# Patient Record
Sex: FEMALE | Race: BLACK OR AFRICAN AMERICAN | Employment: OTHER | URBAN - METROPOLITAN AREA
[De-identification: names, ages, dates, MRNs, and addresses within clinical notes are randomized per-mention and may not be internally consistent; named-entity substitution may affect disease eponyms.]

---

## 2017-04-26 DIAGNOSIS — E11.9 TYPE 2 DIABETES MELLITUS WITHOUT COMPLICATION (H): ICD-10-CM

## 2017-04-27 RX ORDER — FLURBIPROFEN SODIUM 0.3 MG/ML
SOLUTION/ DROPS OPHTHALMIC
Qty: 100 EACH | Refills: 5 | Status: SHIPPED | OUTPATIENT
Start: 2017-04-27 | End: 2018-10-17

## 2017-04-28 DIAGNOSIS — Z79.4 TYPE 2 DIABETES MELLITUS WITH HYPERGLYCEMIA, WITH LONG-TERM CURRENT USE OF INSULIN (H): ICD-10-CM

## 2017-04-28 DIAGNOSIS — E11.65 TYPE 2 DIABETES MELLITUS WITH HYPERGLYCEMIA, WITH LONG-TERM CURRENT USE OF INSULIN (H): ICD-10-CM

## 2017-05-01 RX ORDER — CANAGLIFLOZIN 100 MG/1
TABLET, FILM COATED ORAL
Qty: 90 TABLET | Refills: 0 | OUTPATIENT
Start: 2017-05-01

## 2017-05-01 NOTE — TELEPHONE ENCOUNTER
Recieved refill request for NVOKANA TABS 100MG . Patient needs appointment scheduled prior to any refills. Clinic RN Care Coordinator notified and will follow up with the patient as appropriate. The pharmacy has been notified that the medication will not be refilled prior to an appointment being scheduled.

## 2017-05-03 DIAGNOSIS — Z79.4 TYPE 2 DIABETES MELLITUS WITH HYPERGLYCEMIA, WITH LONG-TERM CURRENT USE OF INSULIN (H): ICD-10-CM

## 2017-05-03 DIAGNOSIS — E11.65 TYPE 2 DIABETES MELLITUS WITH HYPERGLYCEMIA, WITH LONG-TERM CURRENT USE OF INSULIN (H): ICD-10-CM

## 2017-05-17 DIAGNOSIS — Z79.4 TYPE 2 DIABETES MELLITUS WITH HYPERGLYCEMIA, WITH LONG-TERM CURRENT USE OF INSULIN (H): ICD-10-CM

## 2017-05-17 DIAGNOSIS — E11.65 TYPE 2 DIABETES MELLITUS WITH HYPERGLYCEMIA, WITH LONG-TERM CURRENT USE OF INSULIN (H): ICD-10-CM

## 2017-05-20 RX ORDER — CANAGLIFLOZIN 100 MG/1
TABLET, FILM COATED ORAL
Qty: 30 TABLET | OUTPATIENT
Start: 2017-05-20

## 2017-05-20 NOTE — TELEPHONE ENCOUNTER
Recieved refill request for INVOKANA TABS 100MG . Patient needs appointment scheduled prior to any refills. Clinic RN Care Coordinator notified and will follow up with the patient as appropriate. The pharmacy has been notified that the medication will not be refilled prior to an appointment being scheduled.

## 2017-05-22 DIAGNOSIS — E11.65 TYPE 2 DIABETES MELLITUS WITH HYPERGLYCEMIA, WITH LONG-TERM CURRENT USE OF INSULIN (H): ICD-10-CM

## 2017-05-22 DIAGNOSIS — Z79.4 TYPE 2 DIABETES MELLITUS WITH HYPERGLYCEMIA, WITH LONG-TERM CURRENT USE OF INSULIN (H): ICD-10-CM

## 2017-06-15 DIAGNOSIS — E11.65 TYPE 2 DIABETES MELLITUS WITH HYPERGLYCEMIA, WITHOUT LONG-TERM CURRENT USE OF INSULIN (H): ICD-10-CM

## 2017-06-19 NOTE — TELEPHONE ENCOUNTER
metFORMIN       Last Written Prescription Date: 10/31/16  Last Fill Quantity: 180,   # refills: 0  Last Office Visit : 11/7/16  Future Office visit:  7/31/17

## 2017-07-31 ENCOUNTER — OFFICE VISIT (OUTPATIENT)
Dept: ENDOCRINOLOGY | Facility: CLINIC | Age: 72
End: 2017-07-31

## 2017-07-31 VITALS
SYSTOLIC BLOOD PRESSURE: 156 MMHG | HEART RATE: 73 BPM | BODY MASS INDEX: 31.12 KG/M2 | HEIGHT: 62 IN | DIASTOLIC BLOOD PRESSURE: 91 MMHG | WEIGHT: 169.1 LBS | OXYGEN SATURATION: 97 %

## 2017-07-31 DIAGNOSIS — E66.01 MORBID OBESITY WITH BODY MASS INDEX OF 60.0-69.9 IN ADULT (H): ICD-10-CM

## 2017-07-31 DIAGNOSIS — E11.65 TYPE 2 DIABETES MELLITUS WITH HYPERGLYCEMIA, WITHOUT LONG-TERM CURRENT USE OF INSULIN (H): ICD-10-CM

## 2017-07-31 DIAGNOSIS — E11.65 TYPE 2 DIABETES MELLITUS WITH HYPERGLYCEMIA, WITH LONG-TERM CURRENT USE OF INSULIN (H): ICD-10-CM

## 2017-07-31 DIAGNOSIS — Z79.4 TYPE 2 DIABETES MELLITUS WITH HYPERGLYCEMIA, WITH LONG-TERM CURRENT USE OF INSULIN (H): ICD-10-CM

## 2017-07-31 RX ORDER — TOPIRAMATE 25 MG/1
50 TABLET, FILM COATED ORAL DAILY
Qty: 388 TABLET | Refills: 5 | Status: SHIPPED | OUTPATIENT
Start: 2017-07-31

## 2017-07-31 ASSESSMENT — ENCOUNTER SYMPTOMS
LIGHT-HEADEDNESS: 0
NUMBNESS: 0
EYE WATERING: 0
PARALYSIS: 0
HYPERTENSION: 1
LEG PAIN: 1
EYE REDNESS: 0
EYE PAIN: 1
EXERCISE INTOLERANCE: 0
TREMORS: 0
SEIZURES: 0
DOUBLE VISION: 0
LEG SWELLING: 0
SPEECH CHANGE: 1
EYE IRRITATION: 1
TINGLING: 0
PALPITATIONS: 0
TACHYCARDIA: 0
DISTURBANCES IN COORDINATION: 1
MEMORY LOSS: 1
WEAKNESS: 0
HEADACHES: 1
CLAUDICATION: 0
ORTHOPNEA: 0
HYPOTENSION: 0
SYNCOPE: 0
SLEEP DISTURBANCES DUE TO BREATHING: 0
DIZZINESS: 1
LOSS OF CONSCIOUSNESS: 0

## 2017-07-31 NOTE — NURSING NOTE
"Chief Complaint   Patient presents with     Weight Problem     RMWM       Vitals:    07/31/17 1057   BP: (!) 156/91   Pulse: 73   SpO2: 97%   Weight: 169 lb 1.6 oz   Height: 5' 2\"       Body mass index is 30.93 kg/(m^2).    Alex Warren CMA                       "

## 2017-07-31 NOTE — PROGRESS NOTES
"    Return Medical Weight Management Note     Tita Way  MRN:  [id number]  :  1945  ROHAN:  2017    Dear Dr. Noel,     I had the pleasure of seeing your patient Tita Way.  She is a 70 year old female who I am continuing to see for treatment of obesity related to: DM-2, Hyperlipidemia, Sleep Apnea (has CPAP and does not use), Diminished functional capacity (wheelchair bound) and Depression.    CURRENT WEIGHT:   169 lbs 1.6 oz    Wt Readings from Last 4 Encounters:   17 76.7 kg (169 lb 1.6 oz)   16 79.2 kg (174 lb 11.2 oz)   16 78.7 kg (173 lb 8 oz)   16 77.6 kg (171 lb)     Height:  5' 2\"  Body Mass Index:  Body mass index is 30.93 kg/(m^2).  Vitals:  B/P: 162/101, P: 68    Initial consult weight was 356 on 13.  Weight change since last seen on 2016 is down 5 pounds.   Total loss is 187 pounds.    INTERVAL HISTORY:  She is doing well and generally happy with her weight loss, no problems from the medications. Continues on Byetta 10 bid, topiramate 50 bid, metformin 500 bid and invokana 100/d. Her HbA1c is 8.8 at her primary and she needs better DM control.    Diet and Activity Changes Since Last Visit Reviewed With Patient 2017   I have made the following changes to my diet since my last visit: none   With regards to my diet, I am still struggling with: none   For breakfast, I typically eat: eggs turkeysausage    For lunch, I typically eat: turkey breast sandwich   For supper, I typically eat: salad fish chicken   For snack(s), I typically eat: cheese puffs   I have made the following changes to my activity/exercise since my last visit: none   With regards to my activity/exercise, I am still struggling with: walking standing       ROS    MEDICATIONS:   Current Outpatient Prescriptions   Medication     metFORMIN (GLUCOPHAGE) 500 MG tablet     canagliflozin (INVOKANA) 100 MG tablet     B-D U/F insulin pen needle     topiramate (TOPAMAX) 25 MG tablet     " BYETTA 10 MCG PEN 10 MCG/0.04ML pen (contains 2.4 ml = 60 doses)     Alcohol Swabs (ALCOHOL PADS) 70 % PADS     DIPHENHYDRAMINE HCL PO     topiramate (TOPAMAX) 25 MG tablet     guaiFENesin-dextromethorphan (ROBITUSSIN DM) 100-10 MG/5ML syrup     acetaminophen (TYLENOL) 650 MG CR tablet     nystatin (MYCOSTATIN) cream     acetaminophen (TYLENOL) 325 MG tablet     COMPRESSION STOCKINGS     fluticasone (FLONASE) 50 MCG/ACT nasal spray     urea (CARMOL 10) 10 % lotion     ORDER FOR DME     polyethylene glycol (MIRALAX/GLYCOLAX) packet     salmeterol (SEREVENT) 50 MCG/DOSE diskus inhaler     Dextromethorphan-guaiFENesin  MG/5ML LIQD     cholecalciferol 1000 UNITS TABS     docusate sodium (COLACE) 100 MG capsule     aspirin 325 MG tablet     oxybutynin (DITROPAN XL) 10 MG 24 hr tablet     levothyroxine (LEVOTHROID) 25 MCG tablet     metoprolol (LOPRESSOR) 25 MG tablet     lisinopril (PRINIVIL,ZESTRIL) 20 MG tablet     gabapentin (NEURONTIN) 600 MG tablet     Calcium Carbonate-Vitamin D (OSCAL 500/200 D-3 PO)     triamcinolone (KENALOG) 0.1 % cream     traZODone (DESYREL) 100 MG tablet     No current facility-administered medications for this visit.        Weight Loss Medication History Reviewed With Patient 7/31/2017   Which weight loss medications are you currently taking on a regular basis?  Byetta (exentatide), Topamax (topiramate)   If you are not taking a weight loss medication that was prescribed to you, please indicate why: -   Are you having any side effects from the weight loss medication that we have prescribed you? -   If you are having side effects please describe: -     ASSESSMENT:   Very good continuing situation with excellent sustained weight loss (aided by surgery for massive pannus). In view of increased HbA1c - will increase metformin to 1000 bid and invokana to 300/d.    FOLLOW-UP:    12 weeks.  10/15 minutes spent on counseling and education    Sincerely,    Juan Cortes MD

## 2017-07-31 NOTE — MR AVS SNAPSHOT
After Visit Summary   2017    Tita Way    MRN: [id number]           Patient Information     Date Of Birth          1945        Visit Information        Provider Department      2017 10:45 AM Juan Cortes MD [facility] Medical Weight Management        Today's Diagnoses     Morbid obesity with body mass index of 60.0-69.9 in adult (H)        Type 2 diabetes mellitus with hyperglycemia, without long-term current use of insulin (H)        Type 2 diabetes mellitus with hyperglycemia, with long-term current use of insulin (H)          Care Instructions    Increase Invokana to 300 mg daily.    Increase Metformin to 1000 mg twice daily.    Follow up in 3 months with Dr Cortes.            Follow-ups after your visit        Who to contact     Please call your clinic at [phone] to:    Ask questions about your health    Make or cancel appointments    Discuss your medicines    Learn about your test results    Speak to your doctor   If you have compliments or concerns about an experience at your clinic, or if you wish to file a complaint, please contact [facility] Patient Relations at [phone] or email us at [email or web address]         Additional Information About Your Visit        MyChart Information     [employer] is an electronic gateway that provides easy, online access to your medical records. With [employer], you can request a clinic appointment, read your test results, renew a prescription or communicate with your care team.     To sign up for [employer] visit the website at [email or web address]   You will be asked to enter the access code listed below, as well as some personal information. Please follow the directions to create your username and password.     Your access code is: [email or web address]  Expires: 10/15/2017  6:31 AM     Your access code will  in 90 days. If you need help or a new code, please contact your [facility] [place] [facility] or call [phone] for assistance.        Care EveryWhere ID     This is your Care EveryWhere ID. This could be used by other organizations to access your [place] medical records  [id number]        Your Vitals Were     Pulse Height Pulse Oximetry BMI (Body Mass Index)          73 1.575 m (5' 2\") 97% 30.93 kg/m2         Blood Pressure from Last 3 Encounters:   07/31/17 (!) 156/91   11/07/16 154/85   05/02/16 (!) 162/101    Weight from Last 3 Encounters:   07/31/17 76.7 kg (169 lb 1.6 oz)   11/07/16 79.2 kg (174 lb 11.2 oz)   05/02/16 78.7 kg (173 lb 8 oz)              Today, you had the following     No orders found for display         Today's Medication Changes          These changes are accurate as of: 7/31/17 11:28 AM.  If you have any questions, ask your nurse or doctor.               These medicines have changed or have updated prescriptions.        Dose/Directions    canagliflozin 100 MG tablet   Commonly known as:  INVOKANA   This may have changed:  how much to take   Used for:  Type 2 diabetes mellitus with hyperglycemia, with long-term current use of insulin (H)   Changed by:  Juan Cortes MD        Dose:  300 mg   Take 3 tablets (300 mg) by mouth every morning (before breakfast) NEEDS APPOINTMENT   Quantity:  30 tablet   Refills:  5       metFORMIN 500 MG tablet   Commonly known as:  GLUCOPHAGE   This may have changed:  how much to take   Used for:  Type 2 diabetes mellitus with hyperglycemia, without long-term current use of insulin (H)   Changed by:  Juan Cortes MD        Dose:  1000 mg   Take 2 tablets (1,000 mg) by mouth 2 times daily (with meals)   Quantity:  180 tablet   Refills:  5            Where to get your medicines      These medications were sent to [facility] - [place], [place] - [address]  [address] [address]     Phone:  [phone]     canagliflozin 100 MG tablet    metFORMIN 500 " MG tablet    topiramate 25 MG tablet                Primary Care Provider Office Phone # Fax #    Zohreh Noel [phone]       [facility]  [facility] [address]        Equal Access to Services     TERRI BHARDWAJ : Hadii aad ku hadasho Soomaali, waaxda luqadaha, qaybta kaalmada adeegyada, waxay idiin hayaurelion ademona jalloh laTeresaalex guerin. So [facility] [phone].    ATENCIÓN: Si habla español, tiene a morrissey disposición servicios gratuitos de asistencia lingüística. [facility] [phone].    We comply with applicable federal civil rights laws and [place] laws. We do not discriminate on the basis of race, color, national origin, age, disability sex, sexual orientation or gender identity.            Thank you!     Thank you for choosing [facility] WEIGHT MANAGEMENT  for your care. Our goal is always to provide you with excellent care. Hearing back from our patients is one way we can continue to improve our services. Please take a few minutes to complete the written survey that you may receive in the mail after your visit with us. Thank you!             Your Updated Medication List - Protect others around you: Learn how to safely use, store and throw away your medicines at www.disposemymeds.org.          This list is accurate as of: 7/31/17 11:28 AM.  Always use your most recent med list.                   Brand Name Dispense Instructions for use Diagnosis    * acetaminophen 325 MG tablet    TYLENOL     Take 325-650 mg by mouth every 4 hours as needed        * acetaminophen 650 MG CR tablet    TYLENOL    90 tablet    Take 1 tablet (650 mg) by mouth every 8 hours as needed for mild pain or fever    Osteoarthrosis, unspecified whether generalized or localized, unspecified site       Alcohol Pads 70 % Pads     3 Month    1 dose * 2 times daily    Type 2 diabetes mellitus without complication (H)       aspirin 325 MG tablet      Take 1 tablet by mouth 2 times daily.        B-D U/F 31G X 5  MM   Generic drug:  insulin pen needle     100 each    USE TWICE A DAY WITH BYETTA    Type 2 diabetes mellitus without complication (H)       BYETTA 10 MCG PEN 10 MCG/0.04ML injection   Generic drug:  exenatide     7.2 mL    INJECT 10 MCG UNDER THE SKIN TWICE A DAY BEFORE MEALS    Type 2 diabetes mellitus without complication, with long-term current use of insulin (H), Morbid obesity due to excess calories (H)       canagliflozin 100 MG tablet    INVOKANA    30 tablet    Take 3 tablets (300 mg) by mouth every morning (before breakfast) NEEDS APPOINTMENT    Type 2 diabetes mellitus with hyperglycemia, with long-term current use of insulin (H)       cholecalciferol 1000 UNITS Tabs      Take 1 tablet by mouth daily.        * COMPRESSION STOCKINGS     1 each    1 each by Device route daily.    Edema       * Dextromethorphan-guaiFENesin  MG/5ML Liqd      Take 5 mLs by mouth every 4 hours as needed.        * guaiFENesin-dextromethorphan 100-10 MG/5ML syrup    ROBITUSSIN DM    560 mL    Take 5 mLs by mouth every 4 hours as needed for cough    Diabetes mellitus, type 2 (H)       DIPHENHYDRAMINE HCL PO      Take by mouth daily as needed        DITROPAN XL 10 MG 24 hr tablet   Generic drug:  oxybutynin      Take  by mouth daily.        docusate sodium 100 MG capsule    COLACE     Take 1 capsule by mouth daily.        FLONASE 50 MCG/ACT spray   Generic drug:  fluticasone      Spray 2 sprays into both nostrils daily.        gabapentin 600 MG tablet    NEURONTIN     Take 600 mg by mouth 3 times daily.        LEVOTHROID 25 MCG tablet   Generic drug:  levothyroxine      Take  by mouth daily.        lisinopril 20 MG tablet    PRINIVIL/ZESTRIL     Take 20 mg by mouth daily.        metFORMIN 500 MG tablet    GLUCOPHAGE    180 tablet    Take 2 tablets (1,000 mg) by mouth 2 times daily (with meals)    Type 2 diabetes mellitus with hyperglycemia, without long-term current use of insulin (H)       metoprolol 25 MG tablet     "LOPRESSOR     Take 25 mg by mouth 2 times daily.        nystatin cream    MYCOSTATIN    60 g    Apply topically 2 times daily    Dermatophytosis of foot, DM neuro manif type II       * order for DME     1 each    Equipment being ordered: Hospital Bed Weight: 316  Ht: 5'3\"    Chronic acquired lymphedema, Abdominal pannus, Morbid obesity (H), Osteoarthritis of both knees, DM type 2 (diabetes mellitus, type 2) (H)       OSCAL 500/200 D-3 PO      Take  by mouth daily.        polyethylene glycol Packet    MIRALAX/GLYCOLAX    14 each    Take 17 g by mouth daily.    Diabetes mellitus type II       salmeterol 50 MCG/DOSE diskus inhaler    SEREVENT     Inhale 1 puff into the lungs 2 times daily.        * topiramate 25 MG tablet    TOPAMAX    120 tablet    Take 2 tablets (50 mg) by mouth 2 times daily .    Morbid obesity with BMI of 60.0-69.9, adult (H)       * topiramate 25 MG tablet    TOPAMAX    388 tablet    Take 2 tablets (50 mg) by mouth daily    Morbid obesity with body mass index of 60.0-69.9 in adult (H)       traZODone 100 MG tablet    DESYREL     Take  by mouth At Bedtime.        triamcinolone 0.1 % cream    KENALOG     Apply 0.5 inches topically 2 times daily.        urea 10 % lotion    CARMOL 10    240 mL    Apply  topically daily.        * Notice:  This list has 8 medication(s) that are the same as other medications prescribed for you. Read the directions carefully, and ask your doctor or other care provider to review them with you.      "

## 2017-07-31 NOTE — LETTER
"2017       RE: Tita [address]  [address]    [place] [address]     Dear Colleague,    Thank you for referring your patient, Tita Way, to the [facility] MEDICAL WEIGHT MANAGEMENT at [facility]. Please see a copy of my visit note below.        Return Medical Weight Management Note     Tita Way  MRN:  [id number]  :  1945  ROHAN:  2017    Dear Dr. Noel,     I had the pleasure of seeing your patient Tita Way.  She is a 70 year old female who I am continuing to see for treatment of obesity related to: DM-2, Hyperlipidemia, Sleep Apnea (has CPAP and does not use), Diminished functional capacity (wheelchair bound) and Depression.    CURRENT WEIGHT:   169 lbs 1.6 oz    Wt Readings from Last 4 Encounters:   17 76.7 kg (169 lb 1.6 oz)   16 79.2 kg (174 lb 11.2 oz)   16 78.7 kg (173 lb 8 oz)   16 77.6 kg (171 lb)     Height:  5' 2\"  Body Mass Index:  Body mass index is 30.93 kg/(m^2).  Vitals:  B/P: 162/101, P: 68    Initial consult weight was 356 on 13.  Weight change since last seen on 2016 is down 5 pounds.   Total loss is 187 pounds.    INTERVAL HISTORY:  She is doing well and generally happy with her weight loss, no problems from the medications. Continues on Byetta 10 bid, topiramate 50 bid, metformin 500 bid and invokana 100/d. Her HbA1c is 8.8 at her primary and she needs better DM control.    Diet and Activity Changes Since Last Visit Reviewed With Patient 2017   I have made the following changes to my diet since my last visit: none   With regards to my diet, I am still struggling with: none   For breakfast, I typically eat: eggs turkeysausage    For lunch, I typically eat: turkey breast sandwich   For supper, I typically eat: salad fish chicken   For snack(s), I typically eat: cheese puffs   I have made the following changes to my activity/exercise since my last visit: none   With " regards to my activity/exercise, I am still struggling with: walking standing       ROS    MEDICATIONS:   Current Outpatient Prescriptions   Medication     metFORMIN (GLUCOPHAGE) 500 MG tablet     canagliflozin (INVOKANA) 100 MG tablet     B-D U/F insulin pen needle     topiramate (TOPAMAX) 25 MG tablet     BYETTA 10 MCG PEN 10 MCG/0.04ML pen (contains 2.4 ml = 60 doses)     Alcohol Swabs (ALCOHOL PADS) 70 % PADS     DIPHENHYDRAMINE HCL PO     topiramate (TOPAMAX) 25 MG tablet     guaiFENesin-dextromethorphan (ROBITUSSIN DM) 100-10 MG/5ML syrup     acetaminophen (TYLENOL) 650 MG CR tablet     nystatin (MYCOSTATIN) cream     acetaminophen (TYLENOL) 325 MG tablet     COMPRESSION STOCKINGS     fluticasone (FLONASE) 50 MCG/ACT nasal spray     urea (CARMOL 10) 10 % lotion     ORDER FOR DME     polyethylene glycol (MIRALAX/GLYCOLAX) packet     salmeterol (SEREVENT) 50 MCG/DOSE diskus inhaler     Dextromethorphan-guaiFENesin  MG/5ML LIQD     cholecalciferol 1000 UNITS TABS     docusate sodium (COLACE) 100 MG capsule     aspirin 325 MG tablet     oxybutynin (DITROPAN XL) 10 MG 24 hr tablet     levothyroxine (LEVOTHROID) 25 MCG tablet     metoprolol (LOPRESSOR) 25 MG tablet     lisinopril (PRINIVIL,ZESTRIL) 20 MG tablet     gabapentin (NEURONTIN) 600 MG tablet     Calcium Carbonate-Vitamin D (OSCAL 500/200 D-3 PO)     triamcinolone (KENALOG) 0.1 % cream     traZODone (DESYREL) 100 MG tablet     No current facility-administered medications for this visit.        Weight Loss Medication History Reviewed With Patient 7/31/2017   Which weight loss medications are you currently taking on a regular basis?  Byetta (exentatide), Topamax (topiramate)   If you are not taking a weight loss medication that was prescribed to you, please indicate why: -   Are you having any side effects from the weight loss medication that we have prescribed you? -   If you are having side effects please describe: -     ASSESSMENT:   Very good  continuing situation with excellent sustained weight loss (aided by surgery for massive pannus). In view of increased HbA1c - will increase metformin to 1000 bid and invokana to 300/d.    FOLLOW-UP:    12 weeks.  10/15 minutes spent on counseling and education    Sincerely,    Juan Cortes MD

## 2017-07-31 NOTE — PATIENT INSTRUCTIONS
Increase Invokana to 300 mg daily.    Increase Metformin to 1000 mg twice daily.    Follow up in 3 months with Dr Cotres.

## 2017-09-15 DIAGNOSIS — Z79.4 TYPE 2 DIABETES MELLITUS WITH HYPERGLYCEMIA, WITH LONG-TERM CURRENT USE OF INSULIN (H): Primary | ICD-10-CM

## 2017-09-15 DIAGNOSIS — E11.65 TYPE 2 DIABETES MELLITUS WITH HYPERGLYCEMIA, WITH LONG-TERM CURRENT USE OF INSULIN (H): Primary | ICD-10-CM

## 2017-09-26 ENCOUNTER — TELEPHONE (OUTPATIENT)
Dept: ENDOCRINOLOGY | Facility: CLINIC | Age: 72
End: 2017-09-26

## 2017-09-26 DIAGNOSIS — E11.65 TYPE 2 DIABETES MELLITUS WITH HYPERGLYCEMIA, WITH LONG-TERM CURRENT USE OF INSULIN (H): ICD-10-CM

## 2017-09-26 DIAGNOSIS — Z79.4 TYPE 2 DIABETES MELLITUS WITH HYPERGLYCEMIA, WITH LONG-TERM CURRENT USE OF INSULIN (H): ICD-10-CM

## 2017-09-26 NOTE — TELEPHONE ENCOUNTER
----- Message from Soo Callahan sent at 9/26/2017 12:26 PM CDT -----  Regarding: RX Dosage Issue - Dr Cortes  Contact: [phone]  The pt's son Dane called about the RX invokana. He is asking about the dosage/amount dispensed.  The instructions state:  Take 3 tablets (300 mg) by mouth every morning (before breakfast) NEEDS APPOINTMENT.  3 tabs a day, with 30 dispensed, lasts 10 days. Is the pt supposed to be taking it for the entire month? The pt needs clarification.  Also, is the pt to come back for an appt at the end of the 5 refills, or when?  Please call Dane at [phone].    Thanks - Soo    Please DO NOT send this message and/or reply back to sender.  Call Center Representatives DO NOT respond to messages.

## 2017-09-26 NOTE — TELEPHONE ENCOUNTER
Per OV 07/31/17:  ASSESSMENT:   Very good continuing situation with excellent sustained weight loss (aided by surgery for massive pannus). In view of increased HbA1c - will increase metformin to 1000 bid and invokana to 300/d.       New Rx sent to pharmacy per [facility] nurse protocol.  Lillian Pineda RN, BSN

## 2018-02-22 DIAGNOSIS — E66.01 MORBID OBESITY WITH BODY MASS INDEX OF 60.0-69.9 IN ADULT (H): ICD-10-CM

## 2018-02-23 RX ORDER — TOPIRAMATE 25 MG/1
TABLET, FILM COATED ORAL
Qty: 388 TABLET | Refills: 5 | OUTPATIENT
Start: 2018-02-23

## 2018-02-23 NOTE — TELEPHONE ENCOUNTER
Received refill request for  topiramate (TOPAMAX   . Patient needs appointment scheduled prior to any refills. Clinic Coordinator notified and will follow up with the patient as appropriate. The pharmacy has been notified that the medication will not be refilled prior to an appointment being scheduled.    Scheduling has been notified to contact the pt for appointment.

## 2018-04-20 DIAGNOSIS — Z79.4 TYPE 2 DIABETES MELLITUS WITH HYPERGLYCEMIA, WITH LONG-TERM CURRENT USE OF INSULIN (H): ICD-10-CM

## 2018-04-20 DIAGNOSIS — E11.65 TYPE 2 DIABETES MELLITUS WITH HYPERGLYCEMIA, WITH LONG-TERM CURRENT USE OF INSULIN (H): ICD-10-CM

## 2018-04-23 RX ORDER — CANAGLIFLOZIN 100 MG/1
TABLET, FILM COATED ORAL
Qty: 90 TABLET | Refills: 4 | OUTPATIENT
Start: 2018-04-23

## 2018-05-09 DIAGNOSIS — E11.65 TYPE 2 DIABETES MELLITUS WITH HYPERGLYCEMIA, WITH LONG-TERM CURRENT USE OF INSULIN (H): ICD-10-CM

## 2018-05-09 DIAGNOSIS — Z79.4 TYPE 2 DIABETES MELLITUS WITH HYPERGLYCEMIA, WITH LONG-TERM CURRENT USE OF INSULIN (H): ICD-10-CM

## 2018-05-10 RX ORDER — CANAGLIFLOZIN 100 MG/1
TABLET, FILM COATED ORAL
Qty: 30 TABLET | Refills: 5 | OUTPATIENT
Start: 2018-05-10

## 2018-05-10 NOTE — TELEPHONE ENCOUNTER
Received refill request for  canagliflozin (INVOKANA  . Patient needs appointment scheduled prior to any refills. Clinic Coordinator notified and will follow up with the patient as appropriate. The pharmacy has been notified that the medication will not be refilled prior to an appointment being scheduled.    Scheduling has been notified to contact the pt for appointment.

## 2018-06-09 DIAGNOSIS — E11.65 TYPE 2 DIABETES MELLITUS WITH HYPERGLYCEMIA, WITH LONG-TERM CURRENT USE OF INSULIN (H): ICD-10-CM

## 2018-06-09 DIAGNOSIS — Z79.4 TYPE 2 DIABETES MELLITUS WITH HYPERGLYCEMIA, WITH LONG-TERM CURRENT USE OF INSULIN (H): ICD-10-CM

## 2018-07-11 DIAGNOSIS — Z79.4 TYPE 2 DIABETES MELLITUS WITHOUT COMPLICATION, WITH LONG-TERM CURRENT USE OF INSULIN (H): ICD-10-CM

## 2018-07-11 DIAGNOSIS — E66.01 MORBID OBESITY DUE TO EXCESS CALORIES (H): ICD-10-CM

## 2018-07-11 DIAGNOSIS — E11.9 TYPE 2 DIABETES MELLITUS WITHOUT COMPLICATION, WITH LONG-TERM CURRENT USE OF INSULIN (H): ICD-10-CM

## 2018-07-13 NOTE — TELEPHONE ENCOUNTER
Last Clinic Visit: 7/31/2017  [facility] Medical Weight Management

## 2018-10-17 DIAGNOSIS — E11.9 TYPE 2 DIABETES MELLITUS WITHOUT COMPLICATION (H): ICD-10-CM

## 2018-10-17 DIAGNOSIS — E11.65 TYPE 2 DIABETES MELLITUS WITH HYPERGLYCEMIA, WITH LONG-TERM CURRENT USE OF INSULIN (H): ICD-10-CM

## 2018-10-17 DIAGNOSIS — Z79.4 TYPE 2 DIABETES MELLITUS WITH HYPERGLYCEMIA, WITH LONG-TERM CURRENT USE OF INSULIN (H): ICD-10-CM

## 2018-10-17 DIAGNOSIS — E66.01 MORBID OBESITY WITH BODY MASS INDEX OF 60.0-69.9 IN ADULT (H): ICD-10-CM

## 2018-10-18 RX ORDER — FLURBIPROFEN SODIUM 0.3 MG/ML
SOLUTION/ DROPS OPHTHALMIC
Qty: 100 EACH | Refills: 0 | Status: SHIPPED | OUTPATIENT
Start: 2018-10-18 | End: 2019-02-05

## 2018-10-18 RX ORDER — TOPIRAMATE 25 MG/1
TABLET, FILM COATED ORAL
Qty: 388 TABLET | Refills: 5 | OUTPATIENT
Start: 2018-10-18

## 2018-10-18 NOTE — TELEPHONE ENCOUNTER
[place]    Last Written Prescription Date:  6-12-18  Last Fill Quantity: 30,   # refills: 3  Last Office Visit : 7-31-17  Future Office visit:  None    Routing refill request to provider/RN for review/approval because:  Failed protocol- overdue lab  BP  No future appt-scheduling notified.    Recieved refill request for TOPAMAX . Patient needs appointment scheduled prior to any refills. Clinic Coordinator notified and will follow up with the patient as appropriate. The pharmacy has been notified that the medication will not be refilled prior to an appointment being scheduled.

## 2018-10-22 RX ORDER — CANAGLIFLOZIN 100 MG/1
TABLET, FILM COATED ORAL
Qty: 30 TABLET | Refills: 3 | Status: SHIPPED | OUTPATIENT
Start: 2018-10-22

## 2018-12-06 DIAGNOSIS — E11.65 TYPE 2 DIABETES MELLITUS WITH HYPERGLYCEMIA, WITH LONG-TERM CURRENT USE OF INSULIN (H): ICD-10-CM

## 2018-12-06 DIAGNOSIS — Z79.4 TYPE 2 DIABETES MELLITUS WITH HYPERGLYCEMIA, WITH LONG-TERM CURRENT USE OF INSULIN (H): ICD-10-CM

## 2018-12-10 RX ORDER — CANAGLIFLOZIN 100 MG/1
TABLET, FILM COATED ORAL
Qty: 30 TABLET | Refills: 3 | OUTPATIENT
Start: 2018-12-10

## 2019-01-17 DIAGNOSIS — E11.9 TYPE 2 DIABETES MELLITUS WITHOUT COMPLICATION (H): ICD-10-CM

## 2019-01-18 RX ORDER — FLURBIPROFEN SODIUM 0.3 MG/ML
SOLUTION/ DROPS OPHTHALMIC
Refills: 0 | OUTPATIENT
Start: 2019-01-18

## 2019-02-04 ENCOUNTER — TELEPHONE (OUTPATIENT)
Dept: ENDOCRINOLOGY | Facility: CLINIC | Age: 74
End: 2019-02-04

## 2019-02-04 NOTE — TELEPHONE ENCOUNTER
Reason for call:  Other   Patient called regarding (reason for call): call back  Additional comments: In regards to his mothers medication wants to know if she should stay on Invokana due to the other meds she is on and it might cause conflicting issues. Dulera is the other medication pt Tita is prescribed.     Phone number to reach patient:  Home number on file [phone] (home)    Best Time:  Anytime    Can we leave a detailed message on this number?  YES

## 2019-02-04 NOTE — TELEPHONE ENCOUNTER
Called and spoke with patient's son Dane in regards to Tita. Dane states his mother has been started on new medications and would like to know what should be done with INVOKANA 100 MG tablet dose. Advised Qamar that Tita needs to schedule an appointment with Dr. Cortes. Patient hasn't been seen since 7/2017. Appointment made for April with Dr. Cortes. Dane states that his mother will not take the INVOKANA 100 MG tablet until seen back in clinic.

## 2019-02-05 ENCOUNTER — TELEPHONE (OUTPATIENT)
Dept: ENDOCRINOLOGY | Facility: CLINIC | Age: 74
End: 2019-02-05

## 2019-02-05 DIAGNOSIS — E11.9 TYPE 2 DIABETES MELLITUS WITHOUT COMPLICATION (H): ICD-10-CM

## 2019-02-05 DIAGNOSIS — E11.65 TYPE 2 DIABETES MELLITUS WITH HYPERGLYCEMIA, WITH LONG-TERM CURRENT USE OF INSULIN (H): ICD-10-CM

## 2019-02-05 DIAGNOSIS — Z79.4 TYPE 2 DIABETES MELLITUS WITH HYPERGLYCEMIA, WITH LONG-TERM CURRENT USE OF INSULIN (H): ICD-10-CM

## 2019-02-05 RX ORDER — FLURBIPROFEN SODIUM 0.3 MG/ML
SOLUTION/ DROPS OPHTHALMIC
Qty: 100 EACH | Refills: 1 | Status: SHIPPED | OUTPATIENT
Start: 2019-02-05

## 2019-02-05 NOTE — TELEPHONE ENCOUNTER
MD Cortes patient:    Reason for call:  Medication   If this is a refill request, has the caller requested the refill from the pharmacy already? Yes  Will the patient be using a [facility]? No  Name of the pharmacy and phone number for the current request: [facility]    Name of the medication requested: Pen needles for injection and test strips for blood sugar monitor    Other request: Dane Way called on behalf of patient. She is running very low on her supplies and needs them filled ASAP. She did set up an appointment with MD Cortes for a medication review in April, but needs these filled to last her to that appointment.    Phone number to reach patient:  Home number on file [phone] (home)    Best Time:  Anytime    Can we leave a detailed message on this number?  YES

## 2019-02-21 ENCOUNTER — DOCUMENTATION ONLY (OUTPATIENT)
Dept: CARE COORDINATION | Facility: CLINIC | Age: 74
End: 2019-02-21

## 2019-03-19 NOTE — TELEPHONE ENCOUNTER
RECORDS RECEIVED FROM:    DATE RECEIVED: 3.29.19   NOTES STATUS DETAILS   OFFICE NOTE from referring provider N/A    OFFICE NOTE from other specialist N/A    DISCHARGE SUMMARY from hospital N/A    DISCHARGE REPORT from the ER N/A    OPERATIVE REPORT N/A    MEDICATION LIST Internal 2.5.19   IMPLANT RECORD/STICKER N/A    LABS     CBC/DIFF Internal 2.24.19  Glucose labs in care everywhere   CULTURES N/A    INJECTIONS DONE IN RADIOLOGY N/A    MRI N/A    CT SCAN N/A    XRAYS (IMAGES & REPORTS) N/A    TUMOR     PATHOLOGY  Slides & report N/A    Urine no ketones

## 2019-03-28 NOTE — PROGRESS NOTES
Date of Service: 3/29/2019    Chief Complaint   Patient presents with     Consult     DB foot care           HPI: Tita is a 73 year old female who presents today for a diabetic foot exam and management. She relates that her nails are very long and are difficult for her to trim. She is with her son today. She relates that she does get tingling and numbness in her feet. She uses a wheelchair outside of the house but does walk in her house.     Review of Systems: Answers for HPI/ROS submitted by the patient on 3/29/2019   General Symptoms: Yes  Skin Symptoms: No  HENT Symptoms: Yes  EYE SYMPTOMS: Yes  HEART SYMPTOMS: Yes  LUNG SYMPTOMS: Yes  INTESTINAL SYMPTOMS: No  URINARY SYMPTOMS: No  GYNECOLOGIC SYMPTOMS: No  BREAST SYMPTOMS: No  SKELETAL SYMPTOMS: Yes  BLOOD SYMPTOMS: No  NERVOUS SYSTEM SYMPTOMS: Yes  MENTAL HEALTH SYMPTOMS: Yes  Fever: No  Loss of appetite: No  Weight loss: No  Weight gain: No  Fatigue: Yes  Night sweats: No  Chills: Yes  Increased stress: Yes  Excessive hunger: No  Excessive thirst: Yes  Feeling hot or cold when others believe the temperature is normal: Yes  Loss of height: No  Post-operative complications: No  Surgical site pain: No  Hallucinations: No  Change in or Loss of Energy: Yes  Hyperactivity: No  Confusion: Yes  Ear pain: No  Ear discharge: No  Hearing loss: Yes  Tinnitus: Yes  Nosebleeds: No  Congestion: Yes  Sinus pain: Yes  Trouble swallowing: No   Voice hoarseness: Yes  Mouth sores: Yes  Sore throat: No  Tooth pain: No  Gum tenderness: Yes  Bleeding gums: No  Change in taste: No  Change in sense of smell: No  Dry mouth: Yes  Hearing aid used: Yes  Neck lump: No  Eye pain: Yes  Vision loss: No  Dry eyes: Yes  Watery eyes: No  Eye bulging: No  Double vision: No  Flashing of lights: Yes  Spots: Yes  Floaters: Yes  Redness: No  Crossed eyes: No  Tunnel Vision: No  Yellowing of eyes: No  Eye irritation: Yes  Cough: Yes  Sputum or phlegm: Yes  Coughing up blood: No  Difficulty breating or  shortness of breath: Yes  Snoring: No  Wheezing: No  Difficulty breathing on exertion: No  Nighttime Cough: Yes  Difficulty breathing when lying flat: No  Chest pain or pressure: No  Fast or irregular heartbeat: No  Pain in legs with walking: Yes  Trouble breathing while lying down: No  Fingers or toes appear blue: No  High blood pressure: Yes  Low blood pressure: No  Fainting: No  Murmurs: No  Pacemaker: No  Varicose veins: No  Edema or swelling: No  Wake up at night with shortness of breath: No  Light-headedness: Yes  Exercise intolerance: No  Back pain: Yes  Muscle aches: Yes  Neck pain: Yes  Swollen joints: No  Joint pain: Yes  Bone pain: Yes  Muscle cramps: Yes  Muscle weakness: Yes  Joint stiffness: Yes  Bone fracture: No  Trouble with coordination: Yes  Dizziness or trouble with balance: Yes  Fainting or black-out spells: No  Memory loss: Yes  Headache: Yes  Seizures: No  Speech problems: Yes  Tingling: Yes  Tremor: No  Weakness: Yes  Difficulty walking: Yes  Paralysis: No  Numbness: Yes  Nervous or Anxious: Yes  Depression: Yes  Trouble sleeping: Yes  Trouble thinking or concentrating: Yes  Mood changes: Yes  Panic attacks: No  If you checked off any problems, how difficult have these problems made it for you to do your work, take care of things at home, or get along with other people?: Not difficult at all  PHQ9 TOTAL SCORE: 8      PMH:   Past Medical History:   Diagnosis Date     Arthritis      Asthma      COPD (chronic obstructive pulmonary disease) (H)      Degenerative joint disease      Depressive disorder      Diabetes mellitus (H)      Hypertension      Sleep apnea        PSxH:   Past Surgical History:   Procedure Laterality Date     C STOMACH SURGERY PROCEDURE UNLISTED       COLONOSCOPY       EYE SURGERY       GASTRIC BYPASS      unsure when previous surgery was     HERNIORRHAPHY VENTRAL  9/18/2013    Procedure: HERNIORRHAPHY VENTRAL;  Open Ventral Hernia Repair; Open Panniculectomy Anesthesia  General with  block ;  Surgeon: Landon Ortiz MD;  Location: [facility] OR     PANNICULECTOMY  9/18/2013    Procedure: PANNICULECTOMY;;  Surgeon: PRISCILLA Fay MD;  Location: [facility] OR       Allergies: Food; Ibuprofen sodium; and Penicillins    SH:   Social History     Socioeconomic History     Marital status:      Spouse name: Not on file     Number of children: Not on file     Years of education: Not on file     Highest education level: Not on file   Occupational History     Not on file   Social Needs     Financial resource strain: Not on file     Food insecurity:     Worry: Not on file     Inability: Not on file     Transportation needs:     Medical: Not on file     Non-medical: Not on file   Tobacco Use     Smoking status: Current Every Day Smoker     Packs/day: 1.00     Years: 45.00     Pack years: 45.00     Types: Cigarettes     Start date: 1/1/1962     Smokeless tobacco: Never Used   Substance and Sexual Activity     Alcohol use: No     Drug use: No     Sexual activity: No   Lifestyle     Physical activity:     Days per week: Not on file     Minutes per session: Not on file     Stress: Not on file   Relationships     Social connections:     Talks on phone: Not on file     Gets together: Not on file     Attends [employer] service: Not on file     Active member of club or organization: Not on file     Attends meetings of clubs or organizations: Not on file     Relationship status: Not on file     Intimate partner violence:     Fear of current or ex partner: Not on file     Emotionally abused: Not on file     Physically abused: Not on file     Forced sexual activity: Not on file   Other Topics Concern     Parent/sibling w/ CABG, MI or angioplasty before 65F 55M? Not Asked   Social History Narrative     Not on file       FH:   Family History   Problem Relation Age of Onset     Diabetes Isidoro cassidy     Hypertension Son         alan cassidy     Hyperlipidemia Son         alan cassidy     Asthma  Son        Objective:  Data Unavailable Data Unavailable Data Unavailable Data Unavailable Data Unavailable 0 lbs 0 oz    PT and DP pulses are not palpable bilaterally. CRT is 4 seconds. Diminished pedal hair.   Gross sensation is diminished bilaterally. Protective sensation is severely diminished as demonstrated with a 5.07G monofilament.  Equinus is noted bilaterally. No pain with active or passive ROM of the ankle, MTJ, 1st ray, or halluces bilaterally,.   Nails are thickened, discolored, brittle, with subungual debris bilaterally. No open lesions are noted. Xerosis noted BL. Hyperkeratotic lesion plantar left 5th met head.     Assessment: DM2 with neuropathy.  Onychomycosis.   Xerosis  Tyloma.    Plan:  - Pt seen and evaluated.  - Nails debrided x 10.  - Tyloma debrided x 1.  - Rx for AmLactin.   - See again in 3 months.

## 2019-03-29 ENCOUNTER — TELEPHONE (OUTPATIENT)
Dept: ORTHOPEDICS | Facility: CLINIC | Age: 74
End: 2019-03-29

## 2019-03-29 ENCOUNTER — PRE VISIT (OUTPATIENT)
Dept: ORTHOPEDICS | Facility: CLINIC | Age: 74
End: 2019-03-29

## 2019-03-29 ENCOUNTER — OFFICE VISIT (OUTPATIENT)
Dept: ORTHOPEDICS | Facility: CLINIC | Age: 74
End: 2019-03-29
Payer: COMMERCIAL

## 2019-03-29 DIAGNOSIS — L84 TYLOMA: ICD-10-CM

## 2019-03-29 DIAGNOSIS — B35.1 OM (ONYCHOMYCOSIS): ICD-10-CM

## 2019-03-29 DIAGNOSIS — G63 POLYNEUROPATHY ASSOCIATED WITH UNDERLYING DISEASE (H): ICD-10-CM

## 2019-03-29 DIAGNOSIS — Z79.4 TYPE 2 DIABETES MELLITUS WITH HYPERGLYCEMIA, WITH LONG-TERM CURRENT USE OF INSULIN (H): ICD-10-CM

## 2019-03-29 DIAGNOSIS — L85.3 XEROSIS OF SKIN: Primary | ICD-10-CM

## 2019-03-29 DIAGNOSIS — E11.65 TYPE 2 DIABETES MELLITUS WITH HYPERGLYCEMIA, WITH LONG-TERM CURRENT USE OF INSULIN (H): ICD-10-CM

## 2019-03-29 RX ORDER — AMMONIUM LACTATE 12 G/100G
CREAM TOPICAL 2 TIMES DAILY
Qty: 385 G | Refills: 11 | Status: SHIPPED | OUTPATIENT
Start: 2019-03-29

## 2019-03-29 ASSESSMENT — ENCOUNTER SYMPTOMS
MUSCLE WEAKNESS: 1
SLEEP DISTURBANCES DUE TO BREATHING: 0
SNORES LOUDLY: 0
DYSPNEA ON EXERTION: 0
SYNCOPE: 0
LIGHT-HEADEDNESS: 1
FEVER: 0
POSTURAL DYSPNEA: 0
PALPITATIONS: 0
WEAKNESS: 1
NIGHT SWEATS: 0
SHORTNESS OF BREATH: 1
TROUBLE SWALLOWING: 0
HYPOTENSION: 0
FATIGUE: 1
DIZZINESS: 1
COUGH DISTURBING SLEEP: 1
SMELL DISTURBANCE: 0
DECREASED APPETITE: 0
JOINT SWELLING: 0
MEMORY LOSS: 1
SINUS PAIN: 1
WEIGHT LOSS: 0
MUSCLE CRAMPS: 1
ORTHOPNEA: 0
INCREASED ENERGY: 1
BACK PAIN: 1
NUMBNESS: 1
POLYPHAGIA: 0
LOSS OF CONSCIOUSNESS: 0
HOARSE VOICE: 1
STIFFNESS: 1
TREMORS: 0
EYE REDNESS: 0
POLYDIPSIA: 1
DECREASED CONCENTRATION: 1
DISTURBANCES IN COORDINATION: 1
LEG PAIN: 1
NERVOUS/ANXIOUS: 1
WEIGHT GAIN: 0
EXERCISE INTOLERANCE: 0
ALTERED TEMPERATURE REGULATION: 1
PANIC: 0
TINGLING: 1
HALLUCINATIONS: 0
NECK PAIN: 1
COUGH: 1
DOUBLE VISION: 0
HYPERTENSION: 1
HEADACHES: 1
DEPRESSION: 1
MYALGIAS: 1
WHEEZING: 0
INSOMNIA: 1
CHILLS: 1
EYE WATERING: 0
TASTE DISTURBANCE: 0
ARTHRALGIAS: 1
EYE IRRITATION: 1
HEMOPTYSIS: 0
SINUS CONGESTION: 1
SPUTUM PRODUCTION: 1
SEIZURES: 0
NECK MASS: 0
PARALYSIS: 0
SPEECH CHANGE: 1
SORE THROAT: 0
EYE PAIN: 1

## 2019-03-29 ASSESSMENT — PATIENT HEALTH QUESTIONNAIRE - PHQ9
SUM OF ALL RESPONSES TO PHQ QUESTIONS 1-9: 8
10. IF YOU CHECKED OFF ANY PROBLEMS, HOW DIFFICULT HAVE THESE PROBLEMS MADE IT FOR YOU TO DO YOUR WORK, TAKE CARE OF THINGS AT HOME, OR GET ALONG WITH OTHER PEOPLE: NOT DIFFICULT AT ALL
SUM OF ALL RESPONSES TO PHQ QUESTIONS 1-9: 8

## 2019-03-29 NOTE — TELEPHONE ENCOUNTER
Health Call Center    Phone Message    May a detailed message be left on voicemail: yes    Reason for Call: Medication Question or concern regarding medication   Prescription Clarification  Name of Medication: Unknown  Prescribing Provider: Dr Morfin   Pharmacy: [facility] in [place]   What on the order needs clarification? Pt was told she would get a lotion Rx for her feet - she did not get it - and does not know name - requests call back to see if Rx or over the counter and name of medicine - please call Pt or Pt son back - Thanks - Pt saw Dr Morfin earlier today      Action Taken: Message routed to:  [facility] ([facility]

## 2019-03-29 NOTE — NURSING NOTE
Reason For Visit:   Chief Complaint   Patient presents with     Consult     DB foot care        There were no vitals taken for this visit.    Pain Assessment  Patient Currently in Pain: Yes  Primary Pain Location: Foot    Patient states the feet need to be taken care of    Sandra Perez ATC

## 2019-03-29 NOTE — TELEPHONE ENCOUNTER
Attempted to return patients phone call. Unable to leave voicemail, phone kept ringing with no voicemail.     Dr. Morfin placed an order for a prescription for amlactin cream was sent to the [facility] pharmacy per request.

## 2019-03-29 NOTE — LETTER
3/29/2019       RE: [address]  [address]  [address]  [place] [address]     Dear Colleague,    Thank you for referring your patient, Tita Way, to the [facility]. Please see a copy of my visit note below.    Date of Service: 3/29/2019    Chief Complaint   Patient presents with     Consult     DB foot care           HPI: Tita is a 73 year old female who presents today for a diabetic foot exam and management. She relates that her nails are very long and are difficult for her to trim. She is with her son today. She relates that she does get tingling and numbness in her feet. She uses a wheelchair outside of the house but does walk in her house.     Review of Systems:     PMH:   Past Medical History:   Diagnosis Date     Arthritis      Asthma      COPD (chronic obstructive pulmonary disease) (H)      Degenerative joint disease      Depressive disorder      Diabetes mellitus (H)      Hypertension      Sleep apnea        PSxH:   Past Surgical History:   Procedure Laterality Date     C STOMACH SURGERY PROCEDURE UNLISTED       COLONOSCOPY       EYE SURGERY       GASTRIC BYPASS      unsure when previous surgery was     HERNIORRHAPHY VENTRAL  9/18/2013    Procedure: HERNIORRHAPHY VENTRAL;  Open Ventral Hernia Repair; Open Panniculectomy Anesthesia General with  block ;  Surgeon: Landon Ortiz MD;  Location: [facility] OR     PANNICULECTOMY  9/18/2013    Procedure: PANNICULECTOMY;;  Surgeon: PRISCILLA Fay MD;  Location: [facility] OR       Allergies: Food; Ibuprofen sodium; and Penicillins    SH:   Social History     Socioeconomic History     Marital status:      Spouse name: Not on file     Number of children: Not on file     Years of education: Not on file     Highest education level: Not on file   Occupational History     Not on file   Social Needs     Financial resource strain: Not on file     Food insecurity:     Worry:  Not on file     Inability: Not on file     Transportation needs:     Medical: Not on file     Non-medical: Not on file   Tobacco Use     Smoking status: Current Every Day Smoker     Packs/day: 1.00     Years: 45.00     Pack years: 45.00     Types: Cigarettes     Start date: 1/1/1962     Smokeless tobacco: Never Used   Substance and Sexual Activity     Alcohol use: No     Drug use: No     Sexual activity: No   Lifestyle     Physical activity:     Days per week: Not on file     Minutes per session: Not on file     Stress: Not on file   Relationships     Social connections:     Talks on phone: Not on file     Gets together: Not on file     Attends Sikhism service: Not on file     Active member of club or organization: Not on file     Attends meetings of clubs or organizations: Not on file     Relationship status: Not on file     Intimate partner violence:     Fear of current or ex partner: Not on file     Emotionally abused: Not on file     Physically abused: Not on file     Forced sexual activity: Not on file   Other Topics Concern     Parent/sibling w/ CABG, MI or angioplasty before 65F 55M? Not Asked   Social History Narrative     Not on file       FH:   Family History   Problem Relation Age of Onset     Diabetes Son         alan cassidy     Hypertension Son         alan cassidy     Hyperlipidemia Son         alan cassidy     Asthma Son        Objective:  Data Unavailable Data Unavailable Data Unavailable Data Unavailable Data Unavailable 0 lbs 0 oz    PT and DP pulses are not palpable bilaterally. CRT is 4 seconds. Diminished pedal hair.   Gross sensation is diminished bilaterally. Protective sensation is severely diminished as demonstrated with a 5.07G monofilament.  Equinus is noted bilaterally. No pain with active or passive ROM of the ankle, MTJ, 1st ray, or halluces bilaterally,.   Nails are thickened, discolored, brittle, with subungual debris bilaterally. No open lesions are noted. Xerosis noted BL.  Hyperkeratotic lesion plantar left 5th met head.     Assessment: DM2 with neuropathy.  Onychomycosis.   Xerosis  Tyloma.    Plan:  - Pt seen and evaluated.  - Nails debrided x 10.  - Tyloma debrided x 1.  - Rx for AmLactin.   - See again in 3 months.       Again, thank you for allowing me to participate in the care of your patient.      Sincerely,    Perez Morfin DPM

## 2019-03-30 ASSESSMENT — PATIENT HEALTH QUESTIONNAIRE - PHQ9: SUM OF ALL RESPONSES TO PHQ QUESTIONS 1-9: 8

## 2019-05-23 DIAGNOSIS — E66.01 MORBID OBESITY DUE TO EXCESS CALORIES (H): ICD-10-CM

## 2019-05-23 DIAGNOSIS — Z79.4 TYPE 2 DIABETES MELLITUS WITHOUT COMPLICATION, WITH LONG-TERM CURRENT USE OF INSULIN (H): ICD-10-CM

## 2019-05-23 DIAGNOSIS — E11.9 TYPE 2 DIABETES MELLITUS WITHOUT COMPLICATION, WITH LONG-TERM CURRENT USE OF INSULIN (H): ICD-10-CM

## 2019-05-28 NOTE — TELEPHONE ENCOUNTER
: BYETTA INJECT/PEN 10MCG .   Last Written Prescription Date: 7/13/2018  7.2 ml  Last Fill Quantity: 7.2 ml,   # refills: 0  Last Office Visit : 7/31/2017  Future Office visit: None      Routing refill request to provider for review/approval because:  Patient has not been seen since 7/31/2017. No upcoming appts in place.  Patient needs appointment scheduled prior to any refills. Clinic Coordinator notified and will follow up with the patient as appropriate.

## 2019-05-28 NOTE — TELEPHONE ENCOUNTER
Refill request for Byetta DENIED until seen in clinic. Patient has not been seen in almost 2 years.

## 2019-05-29 ENCOUNTER — TELEPHONE (OUTPATIENT)
Dept: ENDOCRINOLOGY | Facility: CLINIC | Age: 74
End: 2019-05-29

## 2019-05-29 NOTE — TELEPHONE ENCOUNTER
Reason for call:  Other   Patient called regarding (reason for call): call back  Additional comments: pt son Dane called in to say that they stopped dylan from taking INVOKANA 100 MG tablet, would like a call back     Phone number to reach patient:  Home number on file [phone] (home)     Best Time:  Anytime before 12pm     Can we leave a detailed message on this number?  YES

## 2019-05-29 NOTE — TELEPHONE ENCOUNTER
Attempted a call back but no mailbox or message capability on their phone. Unable to leave message.

## 2019-06-05 ENCOUNTER — TELEPHONE (OUTPATIENT)
Dept: ENDOCRINOLOGY | Facility: CLINIC | Age: 74
End: 2019-06-05

## 2019-06-05 NOTE — TELEPHONE ENCOUNTER
"Called and spoke with Dane again in regards to medication refill for his mother. Advised that we WILL NOT FILL medications without patient being seen in clinic. Patient just had an abdominal surgery and is unable to come into the clinic. Dane cancelled his mother's appointment with  (scheduled for 6/28/19) stating \"his mother needs to heal.\" Advised patient to have Tita see her PCP or another provider to see if they would prescribe Byetta until seen here in our clinic. Dane hung up the phone.    "

## 2019-06-05 NOTE — TELEPHONE ENCOUNTER
MD Cortes patient:    Reason for call:  Other   Patient called regarding (reason for call): returning call  Additional comments: Patient son said he spoke with Santa and she would not refill the prescription for the patient unless she was first seen in clinic. Patient has just undergone a gall bladder surgery and is unable to come into the clinic due to pain/wound care. He cancelled the appointment, and is wondering if there is any way to get a temporary refill on the medication.      Phone number to reach patient:  Home number on file [phone] (home)    Best Time:  Anytime     Can we leave a detailed message on this number?  Not Applicable

## 2019-06-05 NOTE — TELEPHONE ENCOUNTER
Called and spoke with patient's son Dane. Advised that refills will not be approved until patient is seen in clinic. Patient has not been seen since 2017 with Dr. Cortes. Byetta was last filled for 90 days in 7/2018. Agreed to schedule an appointment to come in for refill.

## 2019-06-05 NOTE — TELEPHONE ENCOUNTER
Reason for call:  Other   Patient called regarding (reason for call): call back  Additional comments: pt son Dane Way is calling to see if Pt is able to get refill on BYETTA INJECT/PEN 10MCG . until she is fully recovered and able to come in and see Dr. Cortes.       Phone number to reach patient:  Home number on file [phone] (home)    Best Time:  Anytime     Can we leave a detailed message on this number?  YES

## 2021-06-07 ENCOUNTER — RECORDS - HEALTHEAST (OUTPATIENT)
Dept: LAB | Facility: CLINIC | Age: 76
End: 2021-06-07

## 2021-06-08 LAB
ANION GAP SERPL CALCULATED.3IONS-SCNC: 8 MMOL/L (ref 5–18)
BUN SERPL-MCNC: 22 MG/DL (ref 8–28)
CALCIUM SERPL-MCNC: 8.7 MG/DL (ref 8.5–10.5)
CHLORIDE BLD-SCNC: 108 MMOL/L (ref 98–107)
CO2 SERPL-SCNC: 19 MMOL/L (ref 22–31)
CREAT SERPL-MCNC: 1.08 MG/DL (ref 0.6–1.1)
GFR SERPL CREATININE-BSD FRML MDRD: 49 ML/MIN/1.73M2
GLUCOSE BLD-MCNC: 84 MG/DL (ref 70–125)
POTASSIUM BLD-SCNC: 4.5 MMOL/L (ref 3.5–5)
SODIUM SERPL-SCNC: 135 MMOL/L (ref 136–145)

## 2021-06-14 ENCOUNTER — RECORDS - HEALTHEAST (OUTPATIENT)
Dept: LAB | Facility: CLINIC | Age: 76
End: 2021-06-14

## 2021-06-15 LAB
ANION GAP SERPL CALCULATED.3IONS-SCNC: 10 MMOL/L (ref 5–18)
BUN SERPL-MCNC: 28 MG/DL (ref 8–28)
CALCIUM SERPL-MCNC: 8.9 MG/DL (ref 8.5–10.5)
CHLORIDE BLD-SCNC: 108 MMOL/L (ref 98–107)
CO2 SERPL-SCNC: 18 MMOL/L (ref 22–31)
CREAT SERPL-MCNC: 1.32 MG/DL (ref 0.6–1.1)
GFR SERPL CREATININE-BSD FRML MDRD: 39 ML/MIN/1.73M2
GLUCOSE BLD-MCNC: 84 MG/DL (ref 70–125)
POTASSIUM BLD-SCNC: 4.8 MMOL/L (ref 3.5–5)
SODIUM SERPL-SCNC: 136 MMOL/L (ref 136–145)

## 2021-06-20 ENCOUNTER — RECORDS - HEALTHEAST (OUTPATIENT)
Dept: LAB | Facility: CLINIC | Age: 76
End: 2021-06-20

## 2021-06-21 LAB
ANION GAP SERPL CALCULATED.3IONS-SCNC: 9 MMOL/L (ref 5–18)
BUN SERPL-MCNC: 32 MG/DL (ref 8–28)
CALCIUM SERPL-MCNC: 8.8 MG/DL (ref 8.5–10.5)
CHLORIDE BLD-SCNC: 112 MMOL/L (ref 98–107)
CO2 SERPL-SCNC: 16 MMOL/L (ref 22–31)
CREAT SERPL-MCNC: 1.19 MG/DL (ref 0.6–1.1)
GFR SERPL CREATININE-BSD FRML MDRD: 44 ML/MIN/1.73M2
GLUCOSE BLD-MCNC: 94 MG/DL (ref 70–125)
POTASSIUM BLD-SCNC: 5.2 MMOL/L (ref 3.5–5)
SODIUM SERPL-SCNC: 137 MMOL/L (ref 136–145)

## 2021-09-08 ENCOUNTER — LAB REQUISITION (OUTPATIENT)
Dept: LAB | Facility: CLINIC | Age: 76
End: 2021-09-08
Payer: COMMERCIAL

## 2021-09-08 DIAGNOSIS — E11.9 TYPE 2 DIABETES MELLITUS WITHOUT COMPLICATIONS (H): ICD-10-CM

## 2021-09-09 LAB
ANION GAP SERPL CALCULATED.3IONS-SCNC: 10 MMOL/L (ref 5–18)
BASOPHILS # BLD AUTO: 0 10E3/UL (ref 0–0.2)
BASOPHILS NFR BLD AUTO: 0 %
BUN SERPL-MCNC: 27 MG/DL (ref 8–28)
CALCIUM SERPL-MCNC: 9.5 MG/DL (ref 8.5–10.5)
CHLORIDE BLD-SCNC: 105 MMOL/L (ref 98–107)
CO2 SERPL-SCNC: 21 MMOL/L (ref 22–31)
CREAT SERPL-MCNC: 1.24 MG/DL (ref 0.6–1.1)
EOSINOPHIL # BLD AUTO: 0.1 10E3/UL (ref 0–0.7)
EOSINOPHIL NFR BLD AUTO: 1 %
ERYTHROCYTE [DISTWIDTH] IN BLOOD BY AUTOMATED COUNT: 17.5 % (ref 10–15)
GFR SERPL CREATININE-BSD FRML MDRD: 42 ML/MIN/1.73M2
GLUCOSE BLD-MCNC: 241 MG/DL (ref 70–125)
HBA1C MFR BLD: 8.2 %
HCT VFR BLD AUTO: 35.9 % (ref 35–47)
HGB BLD-MCNC: 11.3 G/DL (ref 11.7–15.7)
IMM GRANULOCYTES # BLD: 0 10E3/UL
IMM GRANULOCYTES NFR BLD: 0 %
LYMPHOCYTES # BLD AUTO: 1.1 10E3/UL (ref 0.8–5.3)
LYMPHOCYTES NFR BLD AUTO: 22 %
MCH RBC QN AUTO: 27.8 PG (ref 26.5–33)
MCHC RBC AUTO-ENTMCNC: 31.5 G/DL (ref 31.5–36.5)
MCV RBC AUTO: 88 FL (ref 78–100)
MONOCYTES # BLD AUTO: 0.3 10E3/UL (ref 0–1.3)
MONOCYTES NFR BLD AUTO: 6 %
NEUTROPHILS # BLD AUTO: 3.6 10E3/UL (ref 1.6–8.3)
NEUTROPHILS NFR BLD AUTO: 71 %
NRBC # BLD AUTO: 0 10E3/UL
NRBC BLD AUTO-RTO: 0 /100
PLATELET # BLD AUTO: 279 10E3/UL (ref 150–450)
POTASSIUM BLD-SCNC: 4.9 MMOL/L (ref 3.5–5)
RBC # BLD AUTO: 4.07 10E6/UL (ref 3.8–5.2)
SODIUM SERPL-SCNC: 136 MMOL/L (ref 136–145)
WBC # BLD AUTO: 5.1 10E3/UL (ref 4–11)

## 2021-09-09 PROCEDURE — 83036 HEMOGLOBIN GLYCOSYLATED A1C: CPT | Mod: ORL | Performed by: NURSE PRACTITIONER

## 2021-09-09 PROCEDURE — 85025 COMPLETE CBC W/AUTO DIFF WBC: CPT | Mod: ORL | Performed by: NURSE PRACTITIONER

## 2021-09-09 PROCEDURE — 36415 COLL VENOUS BLD VENIPUNCTURE: CPT | Mod: ORL | Performed by: NURSE PRACTITIONER

## 2021-09-09 PROCEDURE — P9604 ONE-WAY ALLOW PRORATED TRIP: HCPCS | Mod: ORL | Performed by: NURSE PRACTITIONER

## 2021-09-09 PROCEDURE — 80048 BASIC METABOLIC PNL TOTAL CA: CPT | Mod: ORL | Performed by: NURSE PRACTITIONER

## 2021-09-21 ENCOUNTER — LAB REQUISITION (OUTPATIENT)
Dept: LAB | Facility: CLINIC | Age: 76
End: 2021-09-21
Payer: COMMERCIAL

## 2021-09-21 DIAGNOSIS — N39.0 URINARY TRACT INFECTION, SITE NOT SPECIFIED: ICD-10-CM

## 2021-09-21 LAB
ALBUMIN UR-MCNC: 30 MG/DL
APPEARANCE UR: ABNORMAL
BACTERIA #/AREA URNS HPF: ABNORMAL /HPF
BILIRUB UR QL STRIP: NEGATIVE
COLOR UR AUTO: YELLOW
GLUCOSE UR STRIP-MCNC: 70 MG/DL
HGB UR QL STRIP: NEGATIVE
HYALINE CASTS: 3 /LPF
KETONES UR STRIP-MCNC: NEGATIVE MG/DL
LEUKOCYTE ESTERASE UR QL STRIP: ABNORMAL
MUCOUS THREADS #/AREA URNS LPF: PRESENT /LPF
NITRATE UR QL: POSITIVE
PH UR STRIP: 5 [PH] (ref 5–7)
RBC URINE: 3 /HPF
SP GR UR STRIP: 1.02 (ref 1–1.03)
SQUAMOUS EPITHELIAL: 8 /HPF
UROBILINOGEN UR STRIP-MCNC: <2 MG/DL
WBC CLUMPS #/AREA URNS HPF: PRESENT /HPF
WBC URINE: 17 /HPF

## 2021-09-21 PROCEDURE — 81001 URINALYSIS AUTO W/SCOPE: CPT | Mod: ORL | Performed by: NURSE PRACTITIONER

## 2021-09-21 PROCEDURE — 87086 URINE CULTURE/COLONY COUNT: CPT | Mod: ORL | Performed by: NURSE PRACTITIONER

## 2021-09-22 ENCOUNTER — LAB REQUISITION (OUTPATIENT)
Dept: LAB | Facility: CLINIC | Age: 76
End: 2021-09-22
Payer: COMMERCIAL

## 2021-09-22 DIAGNOSIS — D64.9 ANEMIA, UNSPECIFIED: ICD-10-CM

## 2021-09-24 LAB
BACTERIA UR CULT: ABNORMAL
BACTERIA UR CULT: ABNORMAL

## 2021-10-27 ENCOUNTER — LAB REQUISITION (OUTPATIENT)
Dept: LAB | Facility: CLINIC | Age: 76
End: 2021-10-27
Payer: COMMERCIAL

## 2021-10-27 DIAGNOSIS — E03.9 HYPOTHYROIDISM, UNSPECIFIED: ICD-10-CM

## 2021-10-28 LAB
ANION GAP SERPL CALCULATED.3IONS-SCNC: 8 MMOL/L (ref 5–18)
BUN SERPL-MCNC: 27 MG/DL (ref 8–28)
CALCIUM SERPL-MCNC: 9.6 MG/DL (ref 8.5–10.5)
CHLORIDE BLD-SCNC: 108 MMOL/L (ref 98–107)
CO2 SERPL-SCNC: 20 MMOL/L (ref 22–31)
CREAT SERPL-MCNC: 1.1 MG/DL (ref 0.6–1.1)
ERYTHROCYTE [DISTWIDTH] IN BLOOD BY AUTOMATED COUNT: 12.4 % (ref 10–15)
GFR SERPL CREATININE-BSD FRML MDRD: 49 ML/MIN/1.73M2
GLUCOSE BLD-MCNC: 165 MG/DL (ref 70–125)
HCT VFR BLD AUTO: 32.7 % (ref 35–47)
HGB BLD-MCNC: 10.3 G/DL (ref 11.7–15.7)
MCH RBC QN AUTO: 27.8 PG (ref 26.5–33)
MCHC RBC AUTO-ENTMCNC: 31.5 G/DL (ref 31.5–36.5)
MCV RBC AUTO: 88 FL (ref 78–100)
PLATELET # BLD AUTO: 245 10E3/UL (ref 150–450)
POTASSIUM BLD-SCNC: 4.7 MMOL/L (ref 3.5–5)
RBC # BLD AUTO: 3.71 10E6/UL (ref 3.8–5.2)
SODIUM SERPL-SCNC: 136 MMOL/L (ref 136–145)
TSH SERPL DL<=0.005 MIU/L-ACNC: 1.05 UIU/ML (ref 0.3–5)
WBC # BLD AUTO: 4.5 10E3/UL (ref 4–11)

## 2021-10-28 PROCEDURE — 85027 COMPLETE CBC AUTOMATED: CPT | Mod: ORL | Performed by: NURSE PRACTITIONER

## 2021-10-28 PROCEDURE — 80048 BASIC METABOLIC PNL TOTAL CA: CPT | Mod: ORL | Performed by: NURSE PRACTITIONER

## 2021-10-28 PROCEDURE — 36415 COLL VENOUS BLD VENIPUNCTURE: CPT | Mod: ORL | Performed by: NURSE PRACTITIONER

## 2021-10-28 PROCEDURE — 84443 ASSAY THYROID STIM HORMONE: CPT | Mod: ORL | Performed by: NURSE PRACTITIONER

## 2021-10-28 PROCEDURE — P9603 ONE-WAY ALLOW PRORATED MILES: HCPCS | Mod: ORL | Performed by: NURSE PRACTITIONER

## 2021-11-10 ENCOUNTER — LAB REQUISITION (OUTPATIENT)
Dept: LAB | Facility: CLINIC | Age: 76
End: 2021-11-10
Payer: COMMERCIAL

## 2021-11-10 DIAGNOSIS — D63.8 ANEMIA IN OTHER CHRONIC DISEASES CLASSIFIED ELSEWHERE: ICD-10-CM

## 2021-11-11 LAB
BASOPHILS # BLD AUTO: 0 10E3/UL (ref 0–0.2)
BASOPHILS NFR BLD AUTO: 1 %
EOSINOPHIL # BLD AUTO: 0.1 10E3/UL (ref 0–0.7)
EOSINOPHIL NFR BLD AUTO: 2 %
ERYTHROCYTE [DISTWIDTH] IN BLOOD BY AUTOMATED COUNT: 12.2 % (ref 10–15)
FERRITIN SERPL-MCNC: 15 NG/ML (ref 10–130)
HCT VFR BLD AUTO: 32.9 % (ref 35–47)
HGB BLD-MCNC: 10.2 G/DL (ref 11.7–15.7)
IMM GRANULOCYTES # BLD: 0 10E3/UL
IMM GRANULOCYTES NFR BLD: 0 %
LYMPHOCYTES # BLD AUTO: 1.3 10E3/UL (ref 0.8–5.3)
LYMPHOCYTES NFR BLD AUTO: 27 %
MCH RBC QN AUTO: 27.3 PG (ref 26.5–33)
MCHC RBC AUTO-ENTMCNC: 31 G/DL (ref 31.5–36.5)
MCV RBC AUTO: 88 FL (ref 78–100)
MONOCYTES # BLD AUTO: 0.4 10E3/UL (ref 0–1.3)
MONOCYTES NFR BLD AUTO: 8 %
NEUTROPHILS # BLD AUTO: 3 10E3/UL (ref 1.6–8.3)
NEUTROPHILS NFR BLD AUTO: 62 %
NRBC # BLD AUTO: 0 10E3/UL
NRBC BLD AUTO-RTO: 0 /100
PLATELET # BLD AUTO: 267 10E3/UL (ref 150–450)
RBC # BLD AUTO: 3.73 10E6/UL (ref 3.8–5.2)
WBC # BLD AUTO: 4.8 10E3/UL (ref 4–11)

## 2021-11-11 PROCEDURE — P9603 ONE-WAY ALLOW PRORATED MILES: HCPCS | Mod: ORL | Performed by: NURSE PRACTITIONER

## 2021-11-11 PROCEDURE — 82728 ASSAY OF FERRITIN: CPT | Mod: ORL | Performed by: NURSE PRACTITIONER

## 2021-11-11 PROCEDURE — 36415 COLL VENOUS BLD VENIPUNCTURE: CPT | Mod: ORL | Performed by: NURSE PRACTITIONER

## 2021-11-11 PROCEDURE — 85025 COMPLETE CBC W/AUTO DIFF WBC: CPT | Mod: ORL | Performed by: NURSE PRACTITIONER

## 2022-01-26 ENCOUNTER — LAB REQUISITION (OUTPATIENT)
Dept: LAB | Facility: CLINIC | Age: 77
End: 2022-01-26
Payer: COMMERCIAL

## 2022-01-26 DIAGNOSIS — I10 ESSENTIAL (PRIMARY) HYPERTENSION: ICD-10-CM

## 2022-01-27 LAB
ANION GAP SERPL CALCULATED.3IONS-SCNC: 9 MMOL/L (ref 5–18)
BASOPHILS # BLD AUTO: 0 10E3/UL (ref 0–0.2)
BASOPHILS NFR BLD AUTO: 1 %
BUN SERPL-MCNC: 82 MG/DL (ref 8–28)
CALCIUM SERPL-MCNC: 9.6 MG/DL (ref 8.5–10.5)
CHLORIDE BLD-SCNC: 120 MMOL/L (ref 98–107)
CO2 SERPL-SCNC: 15 MMOL/L (ref 22–31)
CREAT SERPL-MCNC: 1.69 MG/DL (ref 0.6–1.1)
EOSINOPHIL # BLD AUTO: 0.1 10E3/UL (ref 0–0.7)
EOSINOPHIL NFR BLD AUTO: 2 %
ERYTHROCYTE [DISTWIDTH] IN BLOOD BY AUTOMATED COUNT: 18 % (ref 10–15)
GFR SERPL CREATININE-BSD FRML MDRD: 31 ML/MIN/1.73M2
GLUCOSE BLD-MCNC: 138 MG/DL (ref 70–125)
HCT VFR BLD AUTO: 38.4 % (ref 35–47)
HGB BLD-MCNC: 12 G/DL (ref 11.7–15.7)
IMM GRANULOCYTES # BLD: 0 10E3/UL
IMM GRANULOCYTES NFR BLD: 1 %
LYMPHOCYTES # BLD AUTO: 0.7 10E3/UL (ref 0.8–5.3)
LYMPHOCYTES NFR BLD AUTO: 18 %
MCH RBC QN AUTO: 27.3 PG (ref 26.5–33)
MCHC RBC AUTO-ENTMCNC: 31.3 G/DL (ref 31.5–36.5)
MCV RBC AUTO: 88 FL (ref 78–100)
MONOCYTES # BLD AUTO: 0.2 10E3/UL (ref 0–1.3)
MONOCYTES NFR BLD AUTO: 5 %
NEUTROPHILS # BLD AUTO: 3.1 10E3/UL (ref 1.6–8.3)
NEUTROPHILS NFR BLD AUTO: 73 %
NRBC # BLD AUTO: 0 10E3/UL
NRBC BLD AUTO-RTO: 0 /100
PLATELET # BLD AUTO: 231 10E3/UL (ref 150–450)
POTASSIUM BLD-SCNC: 5.3 MMOL/L (ref 3.5–5)
RBC # BLD AUTO: 4.39 10E6/UL (ref 3.8–5.2)
SODIUM SERPL-SCNC: 144 MMOL/L (ref 136–145)
WBC # BLD AUTO: 4.1 10E3/UL (ref 4–11)

## 2022-01-27 PROCEDURE — 36415 COLL VENOUS BLD VENIPUNCTURE: CPT | Mod: ORL | Performed by: NURSE PRACTITIONER

## 2022-01-27 PROCEDURE — 80048 BASIC METABOLIC PNL TOTAL CA: CPT | Mod: ORL | Performed by: NURSE PRACTITIONER

## 2022-01-27 PROCEDURE — 85025 COMPLETE CBC W/AUTO DIFF WBC: CPT | Mod: ORL | Performed by: NURSE PRACTITIONER

## 2022-01-27 PROCEDURE — P9603 ONE-WAY ALLOW PRORATED MILES: HCPCS | Mod: ORL | Performed by: NURSE PRACTITIONER

## 2022-01-28 ENCOUNTER — LAB REQUISITION (OUTPATIENT)
Dept: LAB | Facility: CLINIC | Age: 77
End: 2022-01-28
Payer: COMMERCIAL

## 2022-01-28 DIAGNOSIS — N39.0 URINARY TRACT INFECTION, SITE NOT SPECIFIED: ICD-10-CM

## 2022-01-28 LAB
ALBUMIN UR-MCNC: 30 MG/DL
APPEARANCE UR: ABNORMAL
BACTERIA #/AREA URNS HPF: ABNORMAL /HPF
BILIRUB UR QL STRIP: NEGATIVE
COLOR UR AUTO: YELLOW
GLUCOSE UR STRIP-MCNC: NEGATIVE MG/DL
HGB UR QL STRIP: NEGATIVE
KETONES UR STRIP-MCNC: NEGATIVE MG/DL
LEUKOCYTE ESTERASE UR QL STRIP: ABNORMAL
MUCOUS THREADS #/AREA URNS LPF: PRESENT /LPF
NITRATE UR QL: POSITIVE
PH UR STRIP: 5 [PH] (ref 5–7)
RBC URINE: 3 /HPF
SP GR UR STRIP: 1.02 (ref 1–1.03)
SQUAMOUS EPITHELIAL: 21 /HPF
UROBILINOGEN UR STRIP-MCNC: <2 MG/DL
WBC URINE: 14 /HPF

## 2022-01-28 PROCEDURE — 81001 URINALYSIS AUTO W/SCOPE: CPT | Mod: ORL | Performed by: NURSE PRACTITIONER

## 2022-01-28 PROCEDURE — 87086 URINE CULTURE/COLONY COUNT: CPT | Mod: ORL | Performed by: NURSE PRACTITIONER

## 2022-01-30 ENCOUNTER — LAB REQUISITION (OUTPATIENT)
Dept: LAB | Facility: CLINIC | Age: 77
End: 2022-01-30
Payer: COMMERCIAL

## 2022-01-30 DIAGNOSIS — E11.59 TYPE 2 DIABETES MELLITUS WITH OTHER CIRCULATORY COMPLICATIONS (H): ICD-10-CM

## 2022-01-30 LAB — BACTERIA UR CULT: NORMAL

## 2022-01-31 LAB
ANION GAP SERPL CALCULATED.3IONS-SCNC: 8 MMOL/L (ref 5–18)
BUN SERPL-MCNC: 52 MG/DL (ref 8–28)
CALCIUM SERPL-MCNC: 8.8 MG/DL (ref 8.5–10.5)
CHLORIDE BLD-SCNC: 115 MMOL/L (ref 98–107)
CO2 SERPL-SCNC: 15 MMOL/L (ref 22–31)
CREAT SERPL-MCNC: 1.09 MG/DL (ref 0.6–1.1)
GFR SERPL CREATININE-BSD FRML MDRD: 52 ML/MIN/1.73M2
GLUCOSE BLD-MCNC: 110 MG/DL (ref 70–125)
POTASSIUM BLD-SCNC: 4.8 MMOL/L (ref 3.5–5)
SODIUM SERPL-SCNC: 138 MMOL/L (ref 136–145)

## 2022-01-31 PROCEDURE — 87088 URINE BACTERIA CULTURE: CPT | Mod: ORL | Performed by: NURSE PRACTITIONER

## 2022-01-31 PROCEDURE — P9604 ONE-WAY ALLOW PRORATED TRIP: HCPCS | Mod: ORL | Performed by: NURSE PRACTITIONER

## 2022-01-31 PROCEDURE — 36415 COLL VENOUS BLD VENIPUNCTURE: CPT | Mod: ORL | Performed by: NURSE PRACTITIONER

## 2022-01-31 PROCEDURE — 81001 URINALYSIS AUTO W/SCOPE: CPT | Mod: ORL | Performed by: NURSE PRACTITIONER

## 2022-01-31 PROCEDURE — 80048 BASIC METABOLIC PNL TOTAL CA: CPT | Mod: ORL | Performed by: NURSE PRACTITIONER

## 2022-02-01 ENCOUNTER — LAB REQUISITION (OUTPATIENT)
Dept: LAB | Facility: CLINIC | Age: 77
End: 2022-02-01
Payer: COMMERCIAL

## 2022-02-01 DIAGNOSIS — N39.0 URINARY TRACT INFECTION, SITE NOT SPECIFIED: ICD-10-CM

## 2022-02-01 LAB
ALBUMIN UR-MCNC: 20 MG/DL
APPEARANCE UR: ABNORMAL
BACTERIA #/AREA URNS HPF: ABNORMAL /HPF
BILIRUB UR QL STRIP: NEGATIVE
COLOR UR AUTO: YELLOW
GLUCOSE UR STRIP-MCNC: NEGATIVE MG/DL
HGB UR QL STRIP: NEGATIVE
KETONES UR STRIP-MCNC: NEGATIVE MG/DL
LEUKOCYTE ESTERASE UR QL STRIP: ABNORMAL
NITRATE UR QL: POSITIVE
PH UR STRIP: 5 [PH] (ref 5–7)
RBC URINE: 27 /HPF
SP GR UR STRIP: 1.02 (ref 1–1.03)
SQUAMOUS EPITHELIAL: 2 /HPF
UROBILINOGEN UR STRIP-MCNC: <2 MG/DL
WBC CLUMPS #/AREA URNS HPF: PRESENT /HPF
WBC URINE: 29 /HPF

## 2022-02-02 ENCOUNTER — LAB REQUISITION (OUTPATIENT)
Dept: LAB | Facility: CLINIC | Age: 77
End: 2022-02-02
Payer: COMMERCIAL

## 2022-02-02 DIAGNOSIS — E86.0 DEHYDRATION: ICD-10-CM

## 2022-02-02 LAB
BACTERIA UR CULT: ABNORMAL

## 2022-02-03 LAB
ANION GAP SERPL CALCULATED.3IONS-SCNC: 8 MMOL/L (ref 5–18)
BUN SERPL-MCNC: 24 MG/DL (ref 8–28)
CALCIUM SERPL-MCNC: 9.2 MG/DL (ref 8.5–10.5)
CHLORIDE BLD-SCNC: 113 MMOL/L (ref 98–107)
CO2 SERPL-SCNC: 16 MMOL/L (ref 22–31)
CREAT SERPL-MCNC: 0.96 MG/DL (ref 0.6–1.1)
GFR SERPL CREATININE-BSD FRML MDRD: 61 ML/MIN/1.73M2
GLUCOSE BLD-MCNC: 114 MG/DL (ref 70–125)
POTASSIUM BLD-SCNC: 5 MMOL/L (ref 3.5–5)
SODIUM SERPL-SCNC: 137 MMOL/L (ref 136–145)

## 2022-02-03 PROCEDURE — 80048 BASIC METABOLIC PNL TOTAL CA: CPT | Mod: ORL | Performed by: NURSE PRACTITIONER

## 2022-02-03 PROCEDURE — 36415 COLL VENOUS BLD VENIPUNCTURE: CPT | Mod: ORL | Performed by: NURSE PRACTITIONER

## 2022-02-03 PROCEDURE — P9604 ONE-WAY ALLOW PRORATED TRIP: HCPCS | Mod: ORL | Performed by: NURSE PRACTITIONER

## 2022-02-10 ENCOUNTER — LAB REQUISITION (OUTPATIENT)
Dept: LAB | Facility: CLINIC | Age: 77
End: 2022-02-10
Payer: COMMERCIAL

## 2022-02-10 DIAGNOSIS — E11.59 TYPE 2 DIABETES MELLITUS WITH OTHER CIRCULATORY COMPLICATIONS (H): ICD-10-CM

## 2022-02-11 LAB — HBA1C MFR BLD: 8 %

## 2022-02-11 PROCEDURE — P9603 ONE-WAY ALLOW PRORATED MILES: HCPCS | Mod: ORL | Performed by: NURSE PRACTITIONER

## 2022-02-11 PROCEDURE — 36415 COLL VENOUS BLD VENIPUNCTURE: CPT | Mod: ORL | Performed by: NURSE PRACTITIONER

## 2022-02-11 PROCEDURE — 83036 HEMOGLOBIN GLYCOSYLATED A1C: CPT | Mod: ORL | Performed by: NURSE PRACTITIONER

## 2022-09-21 ENCOUNTER — LAB REQUISITION (OUTPATIENT)
Dept: LAB | Facility: CLINIC | Age: 77
End: 2022-09-21
Payer: COMMERCIAL

## 2022-09-21 DIAGNOSIS — E11.9 TYPE 2 DIABETES MELLITUS WITHOUT COMPLICATIONS (H): ICD-10-CM

## 2022-09-26 LAB
ALBUMIN SERPL BCG-MCNC: 3.5 G/DL (ref 3.5–5.2)
ALP SERPL-CCNC: 92 U/L (ref 35–104)
ALT SERPL W P-5'-P-CCNC: 21 U/L (ref 10–35)
ANION GAP SERPL CALCULATED.3IONS-SCNC: 9 MMOL/L (ref 7–15)
AST SERPL W P-5'-P-CCNC: 30 U/L (ref 10–35)
BILIRUB SERPL-MCNC: 0.2 MG/DL
BUN SERPL-MCNC: 26.9 MG/DL (ref 8–23)
CALCIUM SERPL-MCNC: 9.2 MG/DL (ref 8.8–10.2)
CHLORIDE SERPL-SCNC: 100 MMOL/L (ref 98–107)
CREAT SERPL-MCNC: 1.31 MG/DL (ref 0.51–0.95)
DEPRECATED HCO3 PLAS-SCNC: 21 MMOL/L (ref 22–29)
ERYTHROCYTE [DISTWIDTH] IN BLOOD BY AUTOMATED COUNT: 16.6 % (ref 10–15)
GFR SERPL CREATININE-BSD FRML MDRD: 42 ML/MIN/1.73M2
GLUCOSE SERPL-MCNC: 428 MG/DL (ref 70–99)
HBA1C MFR BLD: 12.5 %
HCT VFR BLD AUTO: 32.9 % (ref 35–47)
HGB BLD-MCNC: 9.5 G/DL (ref 11.7–15.7)
MCH RBC QN AUTO: 22 PG (ref 26.5–33)
MCHC RBC AUTO-ENTMCNC: 28.9 G/DL (ref 31.5–36.5)
MCV RBC AUTO: 76 FL (ref 78–100)
PLATELET # BLD AUTO: 378 10E3/UL (ref 150–450)
POTASSIUM SERPL-SCNC: 4.7 MMOL/L (ref 3.4–5.3)
PROT SERPL-MCNC: 7 G/DL (ref 6.4–8.3)
RBC # BLD AUTO: 4.32 10E6/UL (ref 3.8–5.2)
SODIUM SERPL-SCNC: 130 MMOL/L (ref 136–145)
TSH SERPL DL<=0.005 MIU/L-ACNC: 1.66 UIU/ML (ref 0.3–4.2)
WBC # BLD AUTO: 5.1 10E3/UL (ref 4–11)

## 2022-09-26 PROCEDURE — 85027 COMPLETE CBC AUTOMATED: CPT | Mod: ORL | Performed by: INTERNAL MEDICINE

## 2022-09-26 PROCEDURE — 80053 COMPREHEN METABOLIC PANEL: CPT | Mod: ORL | Performed by: INTERNAL MEDICINE

## 2022-09-26 PROCEDURE — 83036 HEMOGLOBIN GLYCOSYLATED A1C: CPT | Mod: ORL | Performed by: INTERNAL MEDICINE

## 2022-09-26 PROCEDURE — 36415 COLL VENOUS BLD VENIPUNCTURE: CPT | Mod: ORL | Performed by: INTERNAL MEDICINE

## 2022-09-26 PROCEDURE — 84443 ASSAY THYROID STIM HORMONE: CPT | Mod: ORL | Performed by: INTERNAL MEDICINE
